# Patient Record
Sex: MALE | Race: WHITE | NOT HISPANIC OR LATINO | Employment: OTHER | URBAN - METROPOLITAN AREA
[De-identification: names, ages, dates, MRNs, and addresses within clinical notes are randomized per-mention and may not be internally consistent; named-entity substitution may affect disease eponyms.]

---

## 2019-08-18 NOTE — PROGRESS NOTES
DEPARTMENT OF NEUROLOGICAL SCIENCES  [address] and [facility]        NEW PATIENT EVALUATION NOTE    Patient: Kandice Salazar  Medical Record Number: # [id number]  YOB: 1945  Date of visit: 8/19/2019    Referring provider: No ref  provider found    ASSESSMENT     Diagnoses for this encounter:  1  Temporal atrophy variant of frontotemporal dementia       Impression of this 68 yo gentleman with given history of 4+ years of some irrational behavior amid major personality changes and irritability, found to have temporal lobe atrophy and impulsivity improved on combination of Celexa and Remeron  He likely has a fronto-temporal dementia and although mainly temporal lobe atrophy found, he does display frontal lobe disinhibition at times that cannot recall for  There is All questions answered to their satisfaction  We discussed about keeping mentally and physically active and SSRI as drug class of choice for mood stabilization long-term  He appears confused why provider is remarking about his overspending money and is quick to defend himself about how "it's mine"  MOCA was 13/30 with 0/5 refills  Proceed with the below  PLAN     · Laboratory workup reviewed in HPI  · Reviewed partial images available of MRI brain showing definite temporal lobe atrophy and increased sulcal prominence  Unlikely normal pressure hydrocephalus as suggested given no gait abnormalities and prominent behavioral cognitive deficits  Full images not available  · Requesting copy of disc of images for our archives  · Repeat MRI brain with NeuroQuant software if new symptoms develop  · They can try Prevegen for a month or two and see if it works for them  If they do not feel it is helpful, then they can stop this  Same for gingko biloba  · He should continue with the F[employer] evaluation, agree with driving restrictions     · He will continue the Celexa and the Remeron at the same dose  His behavior is well-controlled  · Encouraged to remain both physically and mentally active, including crossword puzzles, brain teasers  Online resources at [facility] for cognitive training games for free  · Thank you very much for sending me this interesting patient  · The patient has been instructed to call us about any new neurological problems or medication side effects  · Vignesh Bhatt Emy at [facility] could not accept their insurance  · Return to Clinic in 6 months or sooner as needed  They can also f/u back with their local neurologist if they prefer  A total of 60 minutes were spent face-to-face with this patient, of which 25% was spent on counseling and coordination of care  We discussed the natural history of the patient's condition, differential diagnosis, level of diagnostic certainty, treatment alternatives and their side effects and possible complications  HISTORY OF PRESENT ILLNESS:     Mr Marvin Rojas is a 69 y o  right handed male who has been referred to the [facility] for 2nd opinion evaluation of frontotemporal dementia  The patient was accompanied today  History was obtained from patient and daughter    Main bothersome neurological symptoms today are:   1  Memory issues  He has been followed by Dr LOLA Patton at [facility], last visit 6/10/19  Per chart review and confirmed by wife, over the last 3-4 years in addition to memory issues he has had major changes in his behavior and personality, including significant hypersexuality, overspending his money, aggression towards wife and impulsivity  No physical abuse reported but constantly angry and impulsive against the wife  He has spent all his savings, such as on hundreds of dollars worth of toilet paper and on various women both online and ones he solicited  Pt previously denies these reports and felt there was nothing wrong with him   He was diagnosed with possible frontotemporal dementia  Workup ordered thus far consists of MRI Brain w/wo contrast, vitamin B12, MMA, HIV screen RPR, paraneoplastic panel  They were negative except for low MMA  He was started on Celexa 10mg qDay and Remeron 7 5mg qHS with some success in decreasing agitation  MMSE of 17/30 in 6/2019, and 13/30 by 8/2019  Pt did now allow wife to be present at his earlier evaluations  Pt was advised to give up driving, and pt was resistant  [employer] form was sent by Dr Elías Nielsen  The wife expressed high levels of concern and fear for her safety continuing to live with patient and discussion about potential transfer of care to a supervised living facility was initiated  He was referred for F[employer] evaluation and also to [address] [place] [address] [address] [place] [address]  Living Situation + ADLs: Previously an  for his job, fired from job due to the above issues  REVIEW OF PAST MEDICAL, SOCIAL AND FAMILY HISTORY:  This is the list of problems as per our Medical Records:    Patient Active Problem List    Diagnosis Date Noted    Temporal atrophy variant of frontotemporal dementia 08/20/2019    Abnormal magnetic resonance imaging of lumbar spine 05/27/2017    Low back pain 05/27/2017    Male erectile disorder 05/27/2017    Lumbar radiculopathy 05/27/2017    Hypertension, essential 05/27/2017    Neoplasm of uncertain behavior of skin 05/27/2017     Past Medical History:   Diagnosis Date    Anxiety     Hypertension       History reviewed  No pertinent surgical history       No Known Allergies     Outpatient Encounter Medications as of 8/19/2019   Medication Sig Dispense Refill    Ascorbic Acid (VITAMIN C PO) Take by mouth      ASPIRIN 81 PO Take 81 mg by mouth daily      citalopram (CeleXA) 10 mg tablet Take 10 mg by mouth daily  11    Ergocalciferol (VITAMIN D2 PO) Take 1 tablet by mouth      lisinopril (ZESTRIL) 10 mg tablet Take 10 mg by mouth daily  11    mirtazapine (REMERON) 7 5 MG tablet Take 7 5 mg by mouth daily       No facility-administered encounter medications on file as of 8/19/2019  Social History     Tobacco Use    Smoking status: Never Smoker    Smokeless tobacco: Never Used   Substance Use Topics    Alcohol use: Yes     Comment: socially      History reviewed  No pertinent family history  REVIEW OF SYSTEMS:  The patient has entered data on an intake form regarding present illness, past medical and surgical history, medications, allergies, family and social history, and a full review of 14 systems  I have reviewed this form with the patient, and all the relevant information has been included on this note  The full review of systems was negative except as stated in HPI and below  Constitutional: Negative  Negative for appetite change and fever  HENT: Negative  Negative for hearing loss, tinnitus, trouble swallowing and voice change  Eyes: Negative  Negative for photophobia and pain  Respiratory: Negative  Negative for shortness of breath  Cardiovascular: Negative  Negative for palpitations  Gastrointestinal: Negative  Negative for nausea and vomiting  Endocrine: Negative  Negative for cold intolerance and heat intolerance  Genitourinary: Negative  Negative for dysuria, frequency and urgency  Musculoskeletal: Positive for back pain  Negative for myalgias and neck pain  Skin: Negative  Negative for rash  Allergic/Immunologic: Negative  Neurological: Negative for dizziness, tremors, seizures, syncope, facial asymmetry, speech difficulty, weakness, light-headedness, numbness and headaches  Positive for memory problems   Hematological: Negative  Does not bruise/bleed easily  Psychiatric/Behavioral: Positive for confusion  Negative for hallucinations and sleep disturbance  The patient is nervous/anxious      PHYSICAL EXAMINATION:     Vital signs:  BP (!) 185/86 (BP Location: Left arm, Patient Position: Sitting, Cuff Size: Standard)   Pulse 69   Ht 5' 6" (1 676 m)   Wt 73 9 kg (163 lb)   BMI 26 31 kg/m²     General:  Well-appearing, well nourished, pleasant patient in no acute distress  Mood and Fund of Knowledge are appropriate  Head:  Normocephalic, atraumatic  Oropharynx and conjunctiva are clear  Speech  No hypophonia, no bradylalia  No scanning speech  Language: Comprehension intact  Neck:  Supple, strong 5/5 forward flexion and retroflexion  Extremities: Range of motion is normal       Cognitive and Mental Exam:  MOCA version 1 0    Points MAX   Visuospatial  ----------   Trails A 1 1   Cube Drawing 0 1   Clock Drawing 2 3   Naming Objects 2 3   Attention  ----------   Digit Span 1 2   Letter Reading 1 1   Serial 7s 1 3   Language  ----------   Repetition 1 2   Fluency 1 1   Abstraction 0 2   Delayed Recall 0 5   Orientation                 3             6              13 30   +0 for college = 13    Cranial Nerves:  CN II:  Direct and consensual light reflexes were equally reactive to light symmetrically  No afferent pupillary defect   Visual fields are full to confrontation  CN III / IV / VI: Extraocular movements were full, with normal pursuit and saccades  CN V:   Facial sensation to light touch was intact  CN VII: Face is symmetric with normal strength  CN VIII: Hearing was assessed using the Calibrated Finger Rub Auditory Screening Test (CALFRAST) and was not abnormal (Better than CALFRAST-Strong-70)  CN X:   Palate is up going bilaterally and symmetrically  CN XI:  Neck muscles are strong  CN XII: Tongue protrusion is at midline with normal movements  No dysarthria  Motor:    Dystonia: none  Dyskinesia: none  Myoclonus: none  Chorea: none  Tics: none      UPDRSIII                Time since last dose:   8/19/19      Speech  0     Facial Expression  0    Postural Tremor (Right) 0    Postural Tremor (Left) 0    Kinetic Tremor (Right)  0    Kinetic Tremor (Left)  0    Rest tremor amplitude RUE 0    Rest tremor amplitude LUE 0    Rest tremor amplitude RLE 0    Rest tremor amplitude LLE 0    Lip/Jaw Tremor  0    Consistency of tremor 0    Finger Taps (Right)   -    Finger Taps (Left)  -    Hand Movement (Right)  -    Hand Movement (Left)   -    Pronation/Supination (Right)  -    Pronation/Supination (Left)   -    Toe Tapping (Right) -    Toe Tapping (Left) -    Leg Agility (Right)  -    Leg Agility (Left)   -     Rigidity - Neck  -     Rigidity - Upper Extremity (Right)  -      Rigidity - Upper Extremity (Left)   -     Rigidity - Lower Extremity (Right)  -    Rigidity - Lower Extremity (Left)   -    Arising from Chair   0      Gait   0     Freezing of Gait 0     Postural Stability  -     Posture 0     Global spontaneity of movement 0       -------------------------------------------------------------------------------------    Muscle Strength Right Left  Muscle Strength Right Left   Deltoid 5/5 5/5  Hip Adductors 5/5 5/5   Biceps 5/5 5/5  Hip Abductors 5/5 5/5   Triceps 5/5 5/5  Knee Extensors 5/5 5/5   Wrist Extensors 5/5 5/5  Knee Flexors 5/5 5/5   Wrist Flexors 5/5 5/5  Ankle Extensors 5/5 5/5    5/5 5/5  Ankle Flexors 5/5 5/5   Finger Abductors 5/5 5/5       Hip Flexors 5/5 5/5   Hip Extensors 5/5 5/5      Gait:  Normal raising, stance, gait, turns     Reflexes:    Right Left   Biceps 2/4 2/4   Brachioradialis 2/4 2/4   Triceps 2/4 2/4   Knee 2/4 2/4   Ankle 2/4 2/4

## 2019-08-19 ENCOUNTER — CONSULT (OUTPATIENT)
Dept: NEUROLOGY | Facility: CLINIC | Age: 74
End: 2019-08-19
Payer: COMMERCIAL

## 2019-08-19 VITALS
HEIGHT: 66 IN | HEART RATE: 69 BPM | SYSTOLIC BLOOD PRESSURE: 185 MMHG | WEIGHT: 163 LBS | DIASTOLIC BLOOD PRESSURE: 86 MMHG | BODY MASS INDEX: 26.2 KG/M2

## 2019-08-19 DIAGNOSIS — F02.80 TEMPORAL ATROPHY VARIANT OF FRONTOTEMPORAL DEMENTIA (HCC): Primary | ICD-10-CM

## 2019-08-19 DIAGNOSIS — G31.09 TEMPORAL ATROPHY VARIANT OF FRONTOTEMPORAL DEMENTIA (HCC): Primary | ICD-10-CM

## 2019-08-19 PROCEDURE — 99204 OFFICE O/P NEW MOD 45 MIN: CPT | Performed by: PSYCHIATRY & NEUROLOGY

## 2019-08-19 RX ORDER — LISINOPRIL 10 MG/1
10 TABLET ORAL DAILY
Refills: 11 | COMMUNITY
Start: 2019-06-05

## 2019-08-19 RX ORDER — MIRTAZAPINE 7.5 MG/1
7.5 TABLET, FILM COATED ORAL DAILY
COMMUNITY

## 2019-08-19 RX ORDER — CITALOPRAM 10 MG/1
10 TABLET ORAL DAILY
Refills: 11 | COMMUNITY
Start: 2019-06-10

## 2019-08-19 NOTE — PATIENT INSTRUCTIONS
· Laboratory workup reviewed in HPI  · Reviewed partial images available of MRI brain showing definite temporal lobe atrophy and increased sulcal prominence  Unlikely normal pressure hydrocephalus as suggested given no gait abnormalities and prominent behavioral cognitive deficits  Full images not available  · Requesting copy of disc of images for our archives  · Repeat MRI brain with Neuro[employer]Jam software if new symptoms develop  · They can try Prevegen for a month or two and see if it works for them  If they do not feel it is helpful, then they can stop this  Same for gingko biloba  · He will continue the Celexa and the Remeron at the same dose  His behavior is well-controlled  · Encouraged to remain both physically and mentally active, including crossword puzzles, brain teasers  Online resources at [employer] for cognitive training games for free  · Thank you very much for sending me this interesting patient  · The patient has been instructed to call us about any new neurological problems or medication side effects  · Vignesh Bhatt 75 at [address] could not accept their insurance  · Return to Clinic in 6 months or sooner as needed  They can also f/u back with their local neurologist if they prefer

## 2019-08-19 NOTE — LETTER
August 20, 2019     Pina Valladares, [address] [id number]    Patient: Ryan Greer   YOB: 1945   Date of Visit: 8/19/2019       Dear Dr Roger Zelaya: Thank you for referring Ryan Greer to me for evaluation  Below are my notes for this consultation  If you have questions, please do not hesitate to call me  I look forward to following your patient along with you  Sincerely,        Jere Carrera MD        CC: No Recipients  Lunyla Beard  8/19/2019 12:25 PM  Sign at close encounter  Review of Systems   Constitutional: Negative  Negative for appetite change and fever  HENT: Negative  Negative for hearing loss, tinnitus, trouble swallowing and voice change  Eyes: Negative  Negative for photophobia and pain  Respiratory: Negative  Negative for shortness of breath  Cardiovascular: Negative  Negative for palpitations  Gastrointestinal: Negative  Negative for nausea and vomiting  Endocrine: Negative  Negative for cold intolerance and heat intolerance  Genitourinary: Negative  Negative for dysuria, frequency and urgency  Musculoskeletal: Positive for back pain  Negative for myalgias and neck pain  Skin: Negative  Negative for rash  Allergic/Immunologic: Negative  Neurological: Negative for dizziness, tremors, seizures, syncope, facial asymmetry, speech difficulty, weakness, light-headedness, numbness and headaches  Positive for memory problems     Hematological: Negative  Does not bruise/bleed easily  Psychiatric/Behavioral: Positive for confusion  Negative for hallucinations and sleep disturbance  The patient is nervous/anxious          Jere Carrera MD  8/20/2019  2:47 AM  Sign at close encounter  [address]        NEW PATIENT EVALUATION NOTE    Patient: Ryan Greer  Medical Record Number: # [id number]  Date of Birth: 1945  Date of visit: 8/19/2019    Referring provider: No ref  provider found    ASSESSMENT     Diagnoses for this encounter:  1  Temporal atrophy variant of frontotemporal dementia       Impression of this 70 yo gentleman with given history of 4+ years of some irrational behavior amid major personality changes and irritability, found to have temporal lobe atrophy and impulsivity improved on combination of Celexa and Remeron  He likely has a fronto-temporal dementia and although mainly temporal lobe atrophy found, he does display frontal lobe disinhibition at times that cannot recall for  There is All questions answered to their satisfaction  We discussed about keeping mentally and physically active and SSRI as drug class of choice for mood stabilization long-term  He appears confused why provider is remarking about his overspending money and is quick to defend himself about how "it's mine"  MOCA was 13/30 with 0/5 refills  Proceed with the below  PLAN     · Laboratory workup reviewed in HPI  · Reviewed partial images available of MRI brain showing definite temporal lobe atrophy and increased sulcal prominence  Unlikely normal pressure hydrocephalus as suggested given no gait abnormalities and prominent behavioral cognitive deficits  Full images not available  · Requesting copy of disc of images for our archives  · Repeat MRI brain with [employer] software if new symptoms develop  · They can try Prevegen for a month or two and see if it works for them  If they do not feel it is helpful, then they can stop this  Same for gingko biloba  · He should continue with the [employer] evaluation, agree with driving restrictions  · He will continue the Celexa and the Remeron at the same dose  His behavior is well-controlled  · Encouraged to remain both physically and mentally active, including crossword puzzles, brain teasers  Online resources at [employer] for cognitive training games for free     · Thank you very much for sending me this interesting patient  · The patient has been instructed to call us about any new neurological problems or medication side effects  · Vignesh Bhatt [address] at [address] could not accept their insurance  · Return to Clinic in 6 months or sooner as needed  They can also f/u back with their local neurologist if they prefer  A total of 60 minutes were spent face-to-face with this patient, of which 25% was spent on counseling and coordination of care  We discussed the natural history of the patient's condition, differential diagnosis, level of diagnostic certainty, treatment alternatives and their side effects and possible complications  HISTORY OF PRESENT ILLNESS:     Mr Rashid Calix is a 69 y o  right handed male who has [facility] [address] [place] [address] for 2nd opinion evaluation of frontotemporal dementia  The patient was accompanied today  History was obtained from patient and daughter    Main bothersome neurological symptoms today are:   1  Memory issues  He has been followed by Dr LOLA Patton at [facility], last visit 6/10/19  Per chart review and confirmed by wife, over the last 3-4 years in addition to memory issues he has had major changes in his behavior and personality, including significant hypersexuality, overspending his money, aggression towards wife and impulsivity  No physical abuse reported but constantly angry and impulsive against the wife  He has spent all his savings, such as on hundreds of dollars worth of toilet paper and on various women both online and ones he solicited  Pt previously denies these reports and felt there was nothing wrong with him  He was diagnosed with possible frontotemporal dementia  Workup ordered thus far consists of MRI Brain w/wo contrast, vitamin B12, MMA, HIV screen RPR, paraneoplastic panel  They were negative except for low MMA   He was started on Celexa 10mg qDay and Remeron 7 5mg qHS with some success in decreasing agitation  MMSE of 17/30 in 6/2019, and 13/30 by 8/2019  Pt did now allow wife to be present at his earlier evaluations  Pt was advised to give up driving, and pt was resistant  [employer] form was sent by Dr Trevor Phillip  The wife expressed high levels of concern and fear for her safety continuing to live with patient and discussion about potential transfer of care to a supervised living facility was initiated  He was referred for [facility] evaluation and also to [address] [place] [address] [address] [facility] [address] [facility]  Living Situation + ADLs: Previously an  for his job, fired from job due to the above issues  REVIEW OF PAST MEDICAL, SOCIAL AND FAMILY HISTORY:  This is the list of problems as per our Medical Records:    Patient Active Problem List    Diagnosis Date Noted    Temporal atrophy variant of frontotemporal dementia 08/20/2019    Abnormal magnetic resonance imaging of lumbar spine 05/27/2017    Low back pain 05/27/2017    Male erectile disorder 05/27/2017    Lumbar radiculopathy 05/27/2017    Hypertension, essential 05/27/2017    Neoplasm of uncertain behavior of skin 05/27/2017     Past Medical History:   Diagnosis Date    Anxiety     Hypertension       History reviewed  No pertinent surgical history  No Known Allergies     Outpatient Encounter Medications as of 8/19/2019   Medication Sig Dispense Refill    Ascorbic Acid (VITAMIN C PO) Take by mouth      ASPIRIN 81 PO Take 81 mg by mouth daily      citalopram (CeleXA) 10 mg tablet Take 10 mg by mouth daily  11    Ergocalciferol (VITAMIN D2 PO) Take 1 tablet by mouth      lisinopril (ZESTRIL) 10 mg tablet Take 10 mg by mouth daily  11    mirtazapine (REMERON) 7 5 MG tablet Take 7 5 mg by mouth daily       No facility-administered encounter medications on file as of 8/19/2019          Social History     Tobacco Use    Smoking status: Never Smoker    Smokeless tobacco: Never Used   Substance Use Topics    Alcohol use: Yes     Comment: socially      History reviewed  No pertinent family history  REVIEW OF SYSTEMS:  The patient has entered data on an intake form regarding present illness, past medical and surgical history, medications, allergies, family and social history, and a full review of 14 systems  I have reviewed this form with the patient, and all the relevant information has been included on this note  The full review of systems was negative except as stated in HPI and below  Constitutional: Negative  Negative for appetite change and fever  HENT: Negative  Negative for hearing loss, tinnitus, trouble swallowing and voice change  Eyes: Negative  Negative for photophobia and pain  Respiratory: Negative  Negative for shortness of breath  Cardiovascular: Negative  Negative for palpitations  Gastrointestinal: Negative  Negative for nausea and vomiting  Endocrine: Negative  Negative for cold intolerance and heat intolerance  Genitourinary: Negative  Negative for dysuria, frequency and urgency  Musculoskeletal: Positive for back pain  Negative for myalgias and neck pain  Skin: Negative  Negative for rash  Allergic/Immunologic: Negative  Neurological: Negative for dizziness, tremors, seizures, syncope, facial asymmetry, speech difficulty, weakness, light-headedness, numbness and headaches  Positive for memory problems   Hematological: Negative  Does not bruise/bleed easily  Psychiatric/Behavioral: Positive for confusion  Negative for hallucinations and sleep disturbance  The patient is nervous/anxious  PHYSICAL EXAMINATION:     Vital signs:  BP (!) 185/86 (BP Location: Left arm, Patient Position: Sitting, Cuff Size: Standard)   Pulse 69   Ht 5' 6" (1 676 m)   Wt 73 9 kg (163 lb)   BMI 26 31 kg/m²      General:  Well-appearing, well nourished, pleasant patient in no acute distress  Mood and Fund of Knowledge are appropriate     Head: Normocephalic, atraumatic  Oropharynx and conjunctiva are clear  Speech  No hypophonia, no bradylalia  No scanning speech  Language: Comprehension intact  Neck:  Supple, strong 5/5 forward flexion and retroflexion  Extremities: Range of motion is normal       Cognitive and Mental Exam:  MOCA version 1 0    Points MAX   Visuospatial  ----------   Trails A 1 1   Cube Drawing 0 1   Clock Drawing 2 3   Naming Objects 2 3   Attention  ----------   Digit Span 1 2   Letter Reading 1 1   Serial 7s 1 3   Language  ----------   Repetition 1 2   Fluency 1 1   Abstraction 0 2   Delayed Recall 0 5   Orientation                 3             6              13 30   +0 for college = 13    Cranial Nerves:  CN II:  Direct and consensual light reflexes were equally reactive to light symmetrically  No afferent pupillary defect   Visual fields are full to confrontation  CN III / IV / VI: Extraocular movements were full, with normal pursuit and saccades  CN V:   Facial sensation to light touch was intact  CN VII: Face is symmetric with normal strength  CN VIII: Hearing was assessed using the Calibrated Finger Rub Auditory Screening Test (CALFRAST) and was not abnormal (Better than CALFRAST-Strong-70)  CN X:   Palate is up going bilaterally and symmetrically  CN XI:  Neck muscles are strong  CN XII: Tongue protrusion is at midline with normal movements  No dysarthria  Motor:    Dystonia: none  Dyskinesia: none  Myoclonus: none  Chorea: none  Tics: none      [id number]                Time since last dose:    8/19/19      Speech  0     Facial Expression  0    Postural Tremor (Right) 0    Postural Tremor (Left) 0    Kinetic Tremor (Right)  0    Kinetic Tremor (Left)  0    Rest tremor amplitude RUE 0    Rest tremor amplitude LUE 0    Rest tremor amplitude RLE 0    Rest tremor amplitude LLE 0    Lip/Jaw Tremor  0    Consistency of tremor 0    Finger Taps (Right)   -    Finger Taps (Left)  -    Hand Movement (Right) -    Hand Movement (Left)   -    Pronation/Supination (Right)  -    Pronation/Supination (Left)   -    Toe Tapping (Right) -    Toe Tapping (Left) -    Leg Agility (Right)  -    Leg Agility (Left)   -     Rigidity - Neck  -     Rigidity - Upper Extremity (Right)  -      Rigidity - Upper Extremity (Left)   -     Rigidity - Lower Extremity (Right)  -    Rigidity - Lower Extremity (Left)   -    Arising from Chair   0      Gait    0     Freezing of Gait 0     Postural Stability  -     Posture 0     Global spontaneity of movement 0       -------------------------------------------------------------------------------------    Muscle Strength Right Left  Muscle Strength Right Left   Deltoid 5/5 5/5  Hip Adductors 5/5 5/5   Biceps 5/5 5/5  Hip Abductors 5/5 5/5   Triceps 5/5 5/5  Knee Extensors 5/5 5/5   Wrist Extensors 5/5 5/5  Knee Flexors 5/5 5/5   Wrist Flexors 5/5 5/5  Ankle Extensors 5/5 5/5    5/5 5/5  Ankle Flexors 5/5 5/5   Finger Abductors 5/5 5/5       Hip Flexors 5/5 5/5   Hip Extensors 5/5 5/5      Gait:  Normal raising, stance, gait, turns     Reflexes:    Right Left   Biceps 2/4 2/4   Brachioradialis 2/4 2/4   Triceps 2/4 2/4   Knee 2/4 2/4   Ankle 2/4 2/4

## 2019-08-19 NOTE — PROGRESS NOTES
Review of Systems   Constitutional: Negative  Negative for appetite change and fever  HENT: Negative  Negative for hearing loss, tinnitus, trouble swallowing and voice change  Eyes: Negative  Negative for photophobia and pain  Respiratory: Negative  Negative for shortness of breath  Cardiovascular: Negative  Negative for palpitations  Gastrointestinal: Negative  Negative for nausea and vomiting  Endocrine: Negative  Negative for cold intolerance and heat intolerance  Genitourinary: Negative  Negative for dysuria, frequency and urgency  Musculoskeletal: Positive for back pain  Negative for myalgias and neck pain  Skin: Negative  Negative for rash  Allergic/Immunologic: Negative  Neurological: Negative for dizziness, tremors, seizures, syncope, facial asymmetry, speech difficulty, weakness, light-headedness, numbness and headaches  Positive for memory problems     Hematological: Negative  Does not bruise/bleed easily  Psychiatric/Behavioral: Positive for confusion  Negative for hallucinations and sleep disturbance  The patient is nervous/anxious

## 2019-08-20 PROBLEM — M54.16 LUMBAR RADICULOPATHY: Status: ACTIVE | Noted: 2017-05-27

## 2019-08-20 PROBLEM — R93.7 ABNORMAL MAGNETIC RESONANCE IMAGING OF LUMBAR SPINE: Status: ACTIVE | Noted: 2017-05-27

## 2019-08-20 PROBLEM — G31.09: Status: ACTIVE | Noted: 2019-08-20

## 2019-08-20 PROBLEM — F02.80: Status: ACTIVE | Noted: 2019-08-20

## 2019-08-20 PROBLEM — M54.50 LOW BACK PAIN: Status: ACTIVE | Noted: 2017-05-27

## 2019-08-20 PROBLEM — D48.5 NEOPLASM OF UNCERTAIN BEHAVIOR OF SKIN: Status: ACTIVE | Noted: 2017-05-27

## 2019-08-20 PROBLEM — I10 HYPERTENSION, ESSENTIAL: Status: ACTIVE | Noted: 2017-05-27

## 2019-08-20 PROBLEM — N52.9 MALE ERECTILE DISORDER: Status: ACTIVE | Noted: 2017-05-27
